# Patient Record
Sex: MALE | Race: WHITE | Employment: FULL TIME | URBAN - METROPOLITAN AREA
[De-identification: names, ages, dates, MRNs, and addresses within clinical notes are randomized per-mention and may not be internally consistent; named-entity substitution may affect disease eponyms.]

---

## 2019-09-28 ENCOUNTER — HOSPITAL ENCOUNTER (EMERGENCY)
Age: 59
Discharge: HOME | End: 2019-09-28
Attending: EMERGENCY MEDICINE
Payer: OTHER GOVERNMENT

## 2019-09-28 VITALS
HEART RATE: 88 BPM | SYSTOLIC BLOOD PRESSURE: 134 MMHG | TEMPERATURE: 98.9 F | WEIGHT: 239 LBS | RESPIRATION RATE: 20 BRPM | HEIGHT: 69 IN | BODY MASS INDEX: 35.4 KG/M2 | DIASTOLIC BLOOD PRESSURE: 89 MMHG | OXYGEN SATURATION: 98 %

## 2019-09-28 DIAGNOSIS — M10.9 PODAGRA: Primary | ICD-10-CM

## 2019-09-28 PROCEDURE — 96372 THER/PROPH/DIAG INJ SC/IM: CPT

## 2019-09-28 PROCEDURE — 74011250636 HC RX REV CODE- 250/636: Performed by: PHYSICIAN ASSISTANT

## 2019-09-28 PROCEDURE — 99281 EMR DPT VST MAYX REQ PHY/QHP: CPT

## 2019-09-28 RX ORDER — INDAPAMIDE 2.5 MG/1
2.5 TABLET, FILM COATED ORAL DAILY
COMMUNITY

## 2019-09-28 RX ORDER — ATORVASTATIN CALCIUM 10 MG/1
10 TABLET, FILM COATED ORAL DAILY
COMMUNITY

## 2019-09-28 RX ORDER — AMITRIPTYLINE HYDROCHLORIDE 75 MG/1
50 TABLET, FILM COATED ORAL
COMMUNITY

## 2019-09-28 RX ADMIN — METHYLPREDNISOLONE SODIUM SUCCINATE 125 MG: 125 INJECTION, POWDER, FOR SOLUTION INTRAMUSCULAR; INTRAVENOUS at 15:19

## 2019-09-28 NOTE — DISCHARGE INSTRUCTIONS
Patient Education        Gout: Care Instructions  Your Care Instructions    Gout is a form of arthritis caused by a buildup of uric acid crystals in a joint. It causes sudden attacks of pain, swelling, redness, and stiffness, usually in one joint, especially the big toe. Gout usually comes on without a cause. But it can be brought on by drinking alcohol (especially beer) or eating seafood and red meat. Taking certain medicines, such as diuretics or aspirin, also can bring on an attack of gout. Taking your medicines as prescribed and following up with your doctor regularly can help you avoid gout attacks in the future. Follow-up care is a key part of your treatment and safety. Be sure to make and go to all appointments, and call your doctor if you are having problems. It's also a good idea to know your test results and keep a list of the medicines you take. How can you care for yourself at home? · If the joint is swollen, put ice or a cold pack on the area for 10 to 20 minutes at a time. Put a thin cloth between the ice and your skin. · Prop up the sore limb on a pillow when you ice it or anytime you sit or lie down during the next 3 days. Try to keep it above the level of your heart. This will help reduce swelling. · Rest sore joints. Avoid activities that put weight or strain on the joints for a few days. Take short rest breaks from your regular activities during the day. · Take your medicines exactly as prescribed. Call your doctor if you think you are having a problem with your medicine. · Take pain medicines exactly as directed. ? If the doctor gave you a prescription medicine for pain, take it as prescribed. ? If you are not taking a prescription pain medicine, ask your doctor if you can take an over-the-counter medicine. · Eat less seafood and red meat. · Check with your doctor before drinking alcohol. · Losing weight, if you are overweight, may help reduce attacks of gout.  But do not go on a \"crash diet. \" Losing a lot of weight in a short amount of time can cause a gout attack. When should you call for help? Call your doctor now or seek immediate medical care if:    · You have a fever.     · The joint is so painful you cannot use it.     · You have sudden, unexplained swelling, redness, warmth, or severe pain in one or more joints.    Watch closely for changes in your health, and be sure to contact your doctor if:    · You have joint pain.     · Your symptoms get worse or are not improving after 2 or 3 days. Where can you learn more? Go to http://lico-clarke.info/. Enter O839 in the search box to learn more about \"Gout: Care Instructions. \"  Current as of: April 1, 2019  Content Version: 12.2  © 7388-2725 [employer]. Care instructions adapted under license by [employer] (which disclaims liability or warranty for this information). If you have questions about a medical condition or this instruction, always ask your healthcare professional. [address] any warranty or liability for your use of this information.

## 2019-09-28 NOTE — ED PROVIDER NOTES
EMERGENCY DEPARTMENT HISTORY AND PHYSICAL EXAM    Date: 9/28/2019  Patient Name: Luis Enrique Briones    History of Presenting Illness     Chief Complaint   Patient presents with    Toe Pain    Lip Swelling         History Provided By: Patient        Additional History (Context): Luis Enrique Briones is a 59 y.o. male with hyperlipidemia and GERD who presents with left great toe pain x 3 days. Patient reports pain that is exacerbated by movement of a toe that is red, mildly swollen, and warm. Patient denies trauma. He works as an analyst. Patient also reports mild left-sided facial swelling that began while driving back from [place] this morning. He states this has happened intermittently in the past and was recently attributed to his hypertension medication. Patient denies facial pain/trauma, difficulty breathing, chest pain, visual disturbances, headache, abdominal pain, nausea, vomiting, numbness, and weakness. He has not taken anything for his symptoms. Patient states he drinks one bottle of wine per day. PCP: Desirae Lopez MD    Current Outpatient Medications   Medication Sig Dispense Refill    indapamide (LOZOL) 2.5 mg tablet Take 2.5 mg by mouth daily.  atorvastatin (LIPITOR) 10 mg tablet Take 10 mg by mouth daily.  amitriptyline (ELAVIL) 75 mg tablet Take 50 mg by mouth nightly.  Cetirizine (ZYRTEC) 10 mg cap Take  by mouth.  OMEPRAZOLE (PRILOSEC PO) Take  by mouth. Past History     Past Medical History:  Past Medical History:   Diagnosis Date    Gastrointestinal disorder     gerd    High cholesterol        Past Surgical History:  Past Surgical History:   Procedure Laterality Date    HX GI      hernia       Family History:  History reviewed. No pertinent family history. Social History:  Social History     Tobacco Use    Smoking status: Never Smoker    Smokeless tobacco: Never Used   Substance Use Topics    Alcohol use:  Yes     Alcohol/week: 12.0 standard drinks Types: 12 Glasses of wine per week     Comment: one bottle of wine/day    Drug use: Never       Allergies:  No Known Allergies      Review of Systems   Review of Systems   Constitutional: Negative for chills and fever. HENT: Positive for facial swelling. Negative for congestion and rhinorrhea. Eyes: Negative for visual disturbance. Respiratory: Negative for cough and shortness of breath. Cardiovascular: Negative for chest pain and palpitations. Gastrointestinal: Negative for abdominal pain, diarrhea, nausea and vomiting. Genitourinary: Negative for dysuria and flank pain. Musculoskeletal: Positive for arthralgias and joint swelling. Negative for back pain. Great toe     Skin: Negative for rash. Neurological: Negative for dizziness, weakness, light-headedness, numbness and headaches. All Other Systems Negative  Physical Exam     Vitals:    09/28/19 1425   BP: 134/89   Pulse: 88   Resp: 20   Temp: 98.9 °F (37.2 °C)   SpO2: 98%   Weight: 108.4 kg (239 lb)   Height: 5' 9\" (1.753 m)     Physical Exam   Constitutional: He appears well-developed and well-nourished. No distress. Male appearing of stated age reclined on stretcher, using cell phone. HENT:   Head: Normocephalic and atraumatic. Right Ear: External ear normal.   Left Ear: External ear normal.   Nose: Nose normal.   Mouth/Throat: Uvula is midline, oropharynx is clear and moist and mucous membranes are normal. No oropharyngeal exudate or posterior oropharyngeal edema. Mild swelling to left side of lips and cheek. Scant left-periorbital swelling. No lingual edema. Swelling spares forehead and zygomatic region of face. Eyes: Pupils are equal, round, and reactive to light. Conjunctivae are normal. No scleral icterus. Neck: Normal range of motion. Neck supple. No JVD present. Cardiovascular: Normal rate, regular rhythm, normal heart sounds and intact distal pulses. Exam reveals no gallop and no friction rub.    No murmur heard.  Pulmonary/Chest: Effort normal and breath sounds normal. No respiratory distress. He has no wheezes. He has no rales. Abdominal: Soft. Bowel sounds are normal. He exhibits no distension and no mass. There is no tenderness. There is no rebound and no guarding. Musculoskeletal:   1+ pedal edema bilaterally. Mild erythema, tenderness, and edema to left great toe. Mild swelling to first-MTP joint and around medial malleolus. No induration or fluctuance. No ecchymosis or wound. Full ROM of left great toe and ankle. No calf tenderness. Lymphadenopathy:     He has no cervical adenopathy. Neurological: He is alert. Skin: Skin is warm and dry. No rash noted. Psychiatric: He has a normal mood and affect. Nursing note and vitals reviewed. Diagnostic Study Results     Labs -   No results found for this or any previous visit (from the past 12 hour(s)). Radiologic Studies -   No orders to display     CT Results  (Last 48 hours)    None        CXR Results  (Last 48 hours)    None            Medical Decision Making   I am the first provider for this patient. I reviewed the vital signs, available nursing notes, past medical history, past surgical history, family history and social history. Vital Signs-Reviewed the patient's vital signs. Comparison:    Records Reviewed: Nursing Notes    Procedures:  Procedures    Provider Notes (Medical Decision Making):     MED RECONCILIATION:  No current facility-administered medications for this encounter. Current Outpatient Medications   Medication Sig    indapamide (LOZOL) 2.5 mg tablet Take 2.5 mg by mouth daily.  atorvastatin (LIPITOR) 10 mg tablet Take 10 mg by mouth daily.  amitriptyline (ELAVIL) 75 mg tablet Take 50 mg by mouth nightly.  Cetirizine (ZYRTEC) 10 mg cap Take  by mouth.  OMEPRAZOLE (PRILOSEC PO) Take  by mouth. Disposition:  Home    DISCHARGE NOTE:     Patient seen, examined and discharged.  Patient has no other complaints, changes, or physical findings. Care plan outlined and precautions discussed. Results of exam were reviewed with the patient. All medications were reviewed with the patient; will d/c home with follow up instructions. All of pt's questions and concerns were addressed. Patient was instructed and agrees to follow up with primary care, as well as to return to the ED upon further deterioration. Patient is ready to go home. .    Follow-up Information     Follow up With Specialties Details Why Contact Info    Ary Mireles MD Family Practice In 2 days As needed [address] [facility]      [address] [facility] EMERGENCY DEPT Emergency Medicine  If symptoms worsen [address] [id number] [phone]          Current Discharge Medication List              Diagnosis     Clinical Impression:   1.  Podagra

## 2019-09-28 NOTE — ED TRIAGE NOTES
Pt c/o left toe pain that started Wednesday. Denies injury. Toe is red and swollen. Also states upper lip started swelling earlier today while driving back from [place].  Has hx of same and has had his BP meds changed

## 2023-08-22 ENCOUNTER — TELEPHONE (OUTPATIENT)
Age: 63
End: 2023-08-22

## 2023-08-22 NOTE — TELEPHONE ENCOUNTER
Left voicemail message for Mr. Mendez Wassaic to contact our office per his voicemail message received requesting to cancel his appointment scheduled on August 24, 2023 with Dr. Zi Beck to determine if he'd like to reschedule appointment.

## 2023-08-24 ENCOUNTER — OFFICE VISIT (OUTPATIENT)
Age: 63
End: 2023-08-24
Payer: OTHER GOVERNMENT

## 2023-08-24 VITALS
BODY MASS INDEX: 35.84 KG/M2 | HEART RATE: 77 BPM | OXYGEN SATURATION: 91 % | DIASTOLIC BLOOD PRESSURE: 85 MMHG | TEMPERATURE: 97.2 F | WEIGHT: 256 LBS | HEIGHT: 71 IN | SYSTOLIC BLOOD PRESSURE: 135 MMHG

## 2023-08-24 DIAGNOSIS — K44.9 HIATAL HERNIA: Primary | ICD-10-CM

## 2023-08-24 DIAGNOSIS — E66.01 MORBID OBESITY (HCC): ICD-10-CM

## 2023-08-24 DIAGNOSIS — K21.9 GASTROESOPHAGEAL REFLUX DISEASE, UNSPECIFIED WHETHER ESOPHAGITIS PRESENT: ICD-10-CM

## 2023-08-24 PROCEDURE — 99204 OFFICE O/P NEW MOD 45 MIN: CPT | Performed by: SURGERY

## 2023-08-24 RX ORDER — ATORVASTATIN CALCIUM 20 MG/1
TABLET, FILM COATED ORAL
COMMUNITY
Start: 2023-07-12

## 2023-08-24 RX ORDER — COLCHICINE 0.6 MG/1
TABLET ORAL
COMMUNITY
Start: 2022-06-10

## 2023-08-24 RX ORDER — INDAPAMIDE 2.5 MG/1
1 TABLET ORAL DAILY
COMMUNITY
Start: 2023-07-13

## 2023-08-24 RX ORDER — CETIRIZINE HYDROCHLORIDE 10 MG/1
TABLET ORAL
COMMUNITY
Start: 2022-08-02

## 2023-08-24 RX ORDER — OMEPRAZOLE 40 MG/1
CAPSULE, DELAYED RELEASE ORAL
COMMUNITY
Start: 2023-07-13

## 2023-08-24 RX ORDER — FAMOTIDINE 40 MG/1
TABLET, FILM COATED ORAL
COMMUNITY
Start: 2022-06-09

## 2023-08-24 NOTE — PROGRESS NOTES
General Surgery Consult      Leroy Campoverde  Admit date: (Not on file)    MRN: [id number]     : 1960     Age: 57 y.o. Attending Physician: Emmanuel Campbell MD, [facility]      History of Present Illness:     Leroy Campoverde is a 57 y.o. male who is self-referred to me for evaluation of severe reflux symptoms and history of hiatal hernia. The patient is well-known to me and I know him personally and he stated that for decades he has been having reflux symptoms and he was diagnosed with hiatal hernia. He is a has been symptomatic and taking medication however he had a trip recently to [place] and he had severe reflux symptoms and he felt he was choking and almost dying. He did not have any upper endoscopy for more than 10 years and he did not have any upper GI study as well. He had a CT scan of abdomen and pelvis that was done 7 years ago and this is the last x-ray that was done and it showed hiatal hernia with esophageal thickening and he suggested of doing an upper endoscopy. Patient also has morbid obesity and has been gaining weight. The patient is taking Prilosec once a day and Pepcid twice a day. He did not have an upper endoscopy for more than 15 years. He denies any previous abdominal surgeries    There are no problems to display for this patient. Past Medical History:   Diagnosis Date    Gastrointestinal disorder     gerd    High cholesterol       Past Surgical History:   Procedure Laterality Date    GI      hernia      Social History     Tobacco Use    Smoking status: Never    Smokeless tobacco: Never   Substance Use Topics    Alcohol use: Yes     Alcohol/week: 12.0 standard drinks      Social History     Tobacco Use   Smoking Status Never   Smokeless Tobacco Never     No family history on file.    Current Outpatient Medications   Medication Sig    colchicine (COLCRYS) 0.6 MG tablet     famotidine (PEPCID) 40 MG tablet     atorvastatin (LIPITOR) 20 MG tablet     omeprazole (PRILOSEC) 40

## 2023-08-24 NOTE — PATIENT INSTRUCTIONS
If you have any questions or concerns about today's appointment, the verbal and/or written instructions you were given for follow up care, please call our office at [phone].     [facility] - [address], [address] [place] [address] Drive    [phone] office  [phone] fax     Dr. David Mejía on October 2, 2023 at 9:45am located at [address] [facility] [phone]

## 2023-08-24 NOTE — PROGRESS NOTES
Marzena Conteh is a 57 y.o. male (: 1960) presenting to address:    Chief Complaint   Patient presents with    New Patient     Hiatal hernia/referrred by Nurse Vincent Gonzalez       Medication list and allergies have been reviewed with Marzena Conteh and updated as of today's date. I have gone over all Medical, Surgical and Social History with Marzena Conteh and updated/added the information accordingly.